# Patient Record
Sex: FEMALE | Race: WHITE | NOT HISPANIC OR LATINO | Employment: FULL TIME | URBAN - METROPOLITAN AREA
[De-identification: names, ages, dates, MRNs, and addresses within clinical notes are randomized per-mention and may not be internally consistent; named-entity substitution may affect disease eponyms.]

---

## 2023-11-22 ENCOUNTER — LAB (OUTPATIENT)
Dept: LAB | Facility: LAB | Age: 63
End: 2023-11-22
Payer: COMMERCIAL

## 2023-11-22 DIAGNOSIS — E78.5 HYPERLIPIDEMIA, UNSPECIFIED: ICD-10-CM

## 2023-11-22 DIAGNOSIS — Z00.00 ENCOUNTER FOR GENERAL ADULT MEDICAL EXAMINATION WITHOUT ABNORMAL FINDINGS: Primary | ICD-10-CM

## 2023-11-22 LAB
ALBUMIN SERPL BCP-MCNC: 4.2 G/DL (ref 3.4–5)
ALP SERPL-CCNC: 68 U/L (ref 33–136)
ALT SERPL W P-5'-P-CCNC: 13 U/L (ref 7–45)
AMPHETAMINES UR QL SCN: NORMAL
ANION GAP SERPL CALC-SCNC: 13 MMOL/L (ref 10–20)
APPEARANCE UR: ABNORMAL
AST SERPL W P-5'-P-CCNC: 16 U/L (ref 9–39)
BARBITURATES UR QL SCN: NORMAL
BASOPHILS # BLD AUTO: 0.06 X10*3/UL (ref 0–0.1)
BASOPHILS NFR BLD AUTO: 1.1 %
BENZODIAZ UR QL SCN: NORMAL
BILIRUB SERPL-MCNC: 0.7 MG/DL (ref 0–1.2)
BILIRUB UR STRIP.AUTO-MCNC: NEGATIVE MG/DL
BUN SERPL-MCNC: 11 MG/DL (ref 6–23)
BZE UR QL SCN: NORMAL
CALCIUM SERPL-MCNC: 10 MG/DL (ref 8.6–10.3)
CANNABINOIDS UR QL SCN: NORMAL
CHLORIDE SERPL-SCNC: 102 MMOL/L (ref 98–107)
CHOLEST SERPL-MCNC: 202 MG/DL (ref 0–199)
CHOLESTEROL/HDL RATIO: 2.3
CO2 SERPL-SCNC: 27 MMOL/L (ref 21–32)
COLOR UR: YELLOW
CREAT SERPL-MCNC: 0.76 MG/DL (ref 0.5–1.05)
EOSINOPHIL # BLD AUTO: 0.15 X10*3/UL (ref 0–0.7)
EOSINOPHIL NFR BLD AUTO: 2.7 %
ERYTHROCYTE [DISTWIDTH] IN BLOOD BY AUTOMATED COUNT: 12.3 % (ref 11.5–14.5)
FENTANYL+NORFENTANYL UR QL SCN: NORMAL
GFR SERPL CREATININE-BSD FRML MDRD: 88 ML/MIN/1.73M*2
GLUCOSE SERPL-MCNC: 85 MG/DL (ref 74–99)
GLUCOSE UR STRIP.AUTO-MCNC: NEGATIVE MG/DL
HCT VFR BLD AUTO: 40.9 % (ref 36–46)
HDLC SERPL-MCNC: 89.7 MG/DL
HGB BLD-MCNC: 13.6 G/DL (ref 12–16)
IMM GRANULOCYTES # BLD AUTO: 0.02 X10*3/UL (ref 0–0.7)
IMM GRANULOCYTES NFR BLD AUTO: 0.4 % (ref 0–0.9)
KETONES UR STRIP.AUTO-MCNC: NEGATIVE MG/DL
LDLC SERPL CALC-MCNC: 91 MG/DL
LEUKOCYTE ESTERASE UR QL STRIP.AUTO: ABNORMAL
LYMPHOCYTES # BLD AUTO: 1.74 X10*3/UL (ref 1.2–4.8)
LYMPHOCYTES NFR BLD AUTO: 31.5 %
MCH RBC QN AUTO: 30.8 PG (ref 26–34)
MCHC RBC AUTO-ENTMCNC: 33.3 G/DL (ref 32–36)
MCV RBC AUTO: 93 FL (ref 80–100)
MONOCYTES # BLD AUTO: 0.45 X10*3/UL (ref 0.1–1)
MONOCYTES NFR BLD AUTO: 8.2 %
NEUTROPHILS # BLD AUTO: 3.1 X10*3/UL (ref 1.2–7.7)
NEUTROPHILS NFR BLD AUTO: 56.1 %
NITRITE UR QL STRIP.AUTO: POSITIVE
NON HDL CHOLESTEROL: 112 MG/DL (ref 0–149)
NRBC BLD-RTO: 0 /100 WBCS (ref 0–0)
OPIATES UR QL SCN: NORMAL
OXYCODONE+OXYMORPHONE UR QL SCN: NORMAL
PCP UR QL SCN: NORMAL
PH UR STRIP.AUTO: 7 [PH]
PLATELET # BLD AUTO: 209 X10*3/UL (ref 150–450)
POTASSIUM SERPL-SCNC: 3.9 MMOL/L (ref 3.5–5.3)
PROT SERPL-MCNC: 6.6 G/DL (ref 6.4–8.2)
PROT UR STRIP.AUTO-MCNC: NEGATIVE MG/DL
RBC # BLD AUTO: 4.42 X10*6/UL (ref 4–5.2)
RBC # UR STRIP.AUTO: NEGATIVE /UL
RBC #/AREA URNS AUTO: ABNORMAL /HPF
SODIUM SERPL-SCNC: 138 MMOL/L (ref 136–145)
SP GR UR STRIP.AUTO: 1.01
TRIGL SERPL-MCNC: 106 MG/DL (ref 0–149)
TSH SERPL-ACNC: 1.72 MIU/L (ref 0.44–3.98)
UROBILINOGEN UR STRIP.AUTO-MCNC: <2 MG/DL
VLDL: 21 MG/DL (ref 0–40)
WBC # BLD AUTO: 5.5 X10*3/UL (ref 4.4–11.3)
WBC #/AREA URNS AUTO: ABNORMAL /HPF

## 2023-11-22 PROCEDURE — 80061 LIPID PANEL: CPT

## 2023-11-22 PROCEDURE — 85025 COMPLETE CBC W/AUTO DIFF WBC: CPT

## 2023-11-22 PROCEDURE — 81001 URINALYSIS AUTO W/SCOPE: CPT

## 2023-11-22 PROCEDURE — 84443 ASSAY THYROID STIM HORMONE: CPT

## 2023-11-22 PROCEDURE — 80053 COMPREHEN METABOLIC PANEL: CPT

## 2023-11-22 PROCEDURE — 80307 DRUG TEST PRSMV CHEM ANLYZR: CPT

## 2023-11-22 PROCEDURE — 36415 COLL VENOUS BLD VENIPUNCTURE: CPT

## 2024-06-26 ENCOUNTER — OFFICE VISIT (OUTPATIENT)
Dept: PAIN MEDICINE | Facility: CLINIC | Age: 64
End: 2024-06-26
Payer: COMMERCIAL

## 2024-06-26 ENCOUNTER — HOSPITAL ENCOUNTER (OUTPATIENT)
Dept: RADIOLOGY | Facility: HOSPITAL | Age: 64
Discharge: HOME | End: 2024-06-26
Payer: COMMERCIAL

## 2024-06-26 VITALS
WEIGHT: 148 LBS | BODY MASS INDEX: 26.22 KG/M2 | SYSTOLIC BLOOD PRESSURE: 131 MMHG | RESPIRATION RATE: 18 BRPM | DIASTOLIC BLOOD PRESSURE: 63 MMHG | OXYGEN SATURATION: 97 % | TEMPERATURE: 98.1 F | HEART RATE: 84 BPM | HEIGHT: 63 IN

## 2024-06-26 DIAGNOSIS — M79.18 RIGHT BUTTOCK PAIN: ICD-10-CM

## 2024-06-26 DIAGNOSIS — M79.18 RIGHT BUTTOCK PAIN: Primary | ICD-10-CM

## 2024-06-26 PROCEDURE — 1159F MED LIST DOCD IN RCRD: CPT | Performed by: ANESTHESIOLOGY

## 2024-06-26 PROCEDURE — 73502 X-RAY EXAM HIP UNI 2-3 VIEWS: CPT | Mod: RT

## 2024-06-26 PROCEDURE — 99214 OFFICE O/P EST MOD 30 MIN: CPT | Performed by: ANESTHESIOLOGY

## 2024-06-26 PROCEDURE — 1125F AMNT PAIN NOTED PAIN PRSNT: CPT | Performed by: ANESTHESIOLOGY

## 2024-06-26 PROCEDURE — 1160F RVW MEDS BY RX/DR IN RCRD: CPT | Performed by: ANESTHESIOLOGY

## 2024-06-26 PROCEDURE — 73502 X-RAY EXAM HIP UNI 2-3 VIEWS: CPT | Mod: RIGHT SIDE | Performed by: RADIOLOGY

## 2024-06-26 PROCEDURE — 1036F TOBACCO NON-USER: CPT | Performed by: ANESTHESIOLOGY

## 2024-06-26 RX ORDER — ATORVASTATIN CALCIUM 10 MG/1
10 TABLET, FILM COATED ORAL
COMMUNITY
Start: 2022-11-08

## 2024-06-26 RX ORDER — TRAMADOL HYDROCHLORIDE 50 MG/1
50 TABLET ORAL EVERY 12 HOURS PRN
COMMUNITY

## 2024-06-26 SDOH — ECONOMIC STABILITY: FOOD INSECURITY: WITHIN THE PAST 12 MONTHS, THE FOOD YOU BOUGHT JUST DIDN'T LAST AND YOU DIDN'T HAVE MONEY TO GET MORE.: NEVER TRUE

## 2024-06-26 SDOH — ECONOMIC STABILITY: FOOD INSECURITY: WITHIN THE PAST 12 MONTHS, YOU WORRIED THAT YOUR FOOD WOULD RUN OUT BEFORE YOU GOT MONEY TO BUY MORE.: NEVER TRUE

## 2024-06-26 ASSESSMENT — PATIENT HEALTH QUESTIONNAIRE - PHQ9
2. FEELING DOWN, DEPRESSED OR HOPELESS: NOT AT ALL
SUM OF ALL RESPONSES TO PHQ9 QUESTIONS 1 AND 2: 0
1. LITTLE INTEREST OR PLEASURE IN DOING THINGS: NOT AT ALL

## 2024-06-26 ASSESSMENT — ENCOUNTER SYMPTOMS
EYE REDNESS: 0
BACK PAIN: 1
LOSS OF SENSATION IN FEET: 0
OCCASIONAL FEELINGS OF UNSTEADINESS: 0
DEPRESSION: 0
SHORTNESS OF BREATH: 0

## 2024-06-26 ASSESSMENT — COLUMBIA-SUICIDE SEVERITY RATING SCALE - C-SSRS
1. IN THE PAST MONTH, HAVE YOU WISHED YOU WERE DEAD OR WISHED YOU COULD GO TO SLEEP AND NOT WAKE UP?: NO
2. HAVE YOU ACTUALLY HAD ANY THOUGHTS OF KILLING YOURSELF?: NO
6. HAVE YOU EVER DONE ANYTHING, STARTED TO DO ANYTHING, OR PREPARED TO DO ANYTHING TO END YOUR LIFE?: NO

## 2024-06-26 ASSESSMENT — PAIN DESCRIPTION - DESCRIPTORS: DESCRIPTORS: ACHING;SHARP;PRESSURE

## 2024-06-26 ASSESSMENT — PAIN SCALES - GENERAL
PAINLEVEL_OUTOF10: 9
PAINLEVEL: 9

## 2024-06-26 ASSESSMENT — LIFESTYLE VARIABLES
HOW OFTEN DO YOU HAVE A DRINK CONTAINING ALCOHOL: 2-3 TIMES A WEEK
HOW OFTEN DO YOU HAVE SIX OR MORE DRINKS ON ONE OCCASION: NEVER
HOW MANY STANDARD DRINKS CONTAINING ALCOHOL DO YOU HAVE ON A TYPICAL DAY: 3 OR 4
AUDIT-C TOTAL SCORE: 4
TOTAL SCORE: 0
SKIP TO QUESTIONS 9-10: 0

## 2024-06-26 ASSESSMENT — PAIN - FUNCTIONAL ASSESSMENT: PAIN_FUNCTIONAL_ASSESSMENT: 0-10

## 2024-06-26 NOTE — PROGRESS NOTES
Chief Complain  Follow-up (For pain in back and right hip. No new images./Had Lumbar facet 11/2022, with a great amount of relief and believe it started to wear off 2 weeks ago. Currently take tramdol from Dr. Salgado from [facility].)       History Of Present Illness  Lilli Guzman is a 64 y.o. female here for right buttock pain. She has been having this pain since last month. The patient rates the pain at 9  on a scale from 0-10.  The patient describes pain as aching.  The pain is worsened by prolonged sitting and going up stairs and is alleviated by lying down.  Since the last visit the pain has worsened.  The patient denies any fever, chills, weight loss, bladder/bowel incontinence.         Past Medical History  She has a past medical history of Atypical squamous cells of undetermined significance on cytologic smear of cervix (ASC-US), Encounter for screening for malignant neoplasm of vagina, Menopausal and female climacteric states, Other specified postprocedural states, Other specified postprocedural states, Personal history of other complications of pregnancy, childbirth and the puerperium, Personal history of other medical treatment, and Personal history of other medical treatment.    Surgical History  She has a past surgical history that includes Other surgical history (07/15/2015); Tubal ligation (07/15/2015); Tonsillectomy (07/24/2017); and Incisional breast biopsy (07/21/2015).     Social History  She reports that she has quit smoking. Her smoking use included cigarettes. She has never been exposed to tobacco smoke. She has never used smokeless tobacco. She reports current alcohol use. She reports that she does not use drugs.    Family History  No family history on file.     Allergies  Patient has no known allergies.    Review of Systems  Review of Systems   HENT:  Negative for ear pain.    Eyes:  Negative for redness.   Respiratory:  Negative for shortness of breath.    Cardiovascular:  Negative  "for chest pain.   Musculoskeletal:  Positive for back pain.   Psychiatric/Behavioral:  Negative for suicidal ideas.         Physical Exam  Physical Exam  Constitutional:       Appearance: Normal appearance.   HENT:      Head: Normocephalic and atraumatic.   Eyes:      Extraocular Movements: Extraocular movements intact.   Cardiovascular:      Rate and Rhythm: Normal rate and regular rhythm.   Pulmonary:      Effort: Pulmonary effort is normal.   Abdominal:      Palpations: Abdomen is soft.   Musculoskeletal:      Cervical back: Neck supple.        Legs:    Skin:     General: Skin is warm.   Neurological:      General: No focal deficit present.      Mental Status: She is alert and oriented to person, place, and time.   Psychiatric:         Mood and Affect: Mood normal.         Behavior: Behavior normal.           Last Recorded Vitals  Blood pressure 131/63, pulse 84, temperature 36.7 °C (98.1 °F), resp. rate 18, height 1.6 m (5' 3\"), weight 67.1 kg (148 lb), SpO2 97%.       Assessment/Plan     Lilli Guzman is a 64 y.o. female here for evaluation of right buttock pain.  She has been experiencing the symptoms for past 1 month or so.  Denies any significant trauma or inciting event.  The pain is worse with walking upstairs and sitting for prolonged period time.  On physical examination she does not have any significant focal neurological deficit.  She has some tenderness in her buttock however no tenderness over the ischial bursa area.  Does have increased pain with external rotation of her right hip.  I would recommend getting an x-ray of her hip to rule out any significant osteoarthritic changes.  May consider corticosteroid injection if there is some osteoarthritic changes.  If negative would recommend trial of physical therapy.          Keaton Medina MD  "

## 2024-06-27 ENCOUNTER — APPOINTMENT (OUTPATIENT)
Dept: PAIN MEDICINE | Facility: CLINIC | Age: 64
End: 2024-06-27
Payer: COMMERCIAL

## 2024-06-28 ENCOUNTER — TELEPHONE (OUTPATIENT)
Dept: PAIN MEDICINE | Facility: CLINIC | Age: 64
End: 2024-06-28
Payer: COMMERCIAL

## 2024-06-28 NOTE — TELEPHONE ENCOUNTER
Called to go over xray results  ----- Message from Keaton Medina MD sent at 6/27/2024  1:09 PM EDT -----  I reviewed the x-ray of her hip does not reveal any significant osteoarthritic changes.  I would recommend that she start the physical therapy, if she does not respond would get an MRI of her lumbar spine for further evaluation.  ----- Message -----  From: Interface, Radiology Results In  Sent: 6/27/2024  10:17 AM EDT  To: Keaton Medina MD

## 2024-07-03 ENCOUNTER — TELEPHONE (OUTPATIENT)
Dept: PAIN MEDICINE | Facility: CLINIC | Age: 64
End: 2024-07-03
Payer: COMMERCIAL

## 2024-07-03 DIAGNOSIS — M79.18 RIGHT BUTTOCK PAIN: Primary | ICD-10-CM

## 2024-07-15 NOTE — PROGRESS NOTES
Physical Therapy    Physical Therapy Evaluation and Treatment      Patient Name: Lilli Guzman  MRN: [id number]  Today's Date: 7/16/2024  Time Calculation  Start Time: 1134  Stop Time: 1215  Time Calculation (min): 41 min    Insurance - reviewed   Visit number: 1 (updated 07/16/24)   Payor: [employer] [facility] / Plan: [employer] MED / Product Type: *No Product type* /    60 PT/OT/ST V PCY 0 USED NO AUTH NEEDED PAYS % NO DED   Referring Provider: Keaton Medina MD       Assessment:  Lilli Guzman  is a 64 y.o. old patient who participated in a physical therapy evaluation today due to R buttock pain. Chronic hx of low back pain, able to effect pain with lumbar movements (suspect extension bias) and SLR test. Lilli presents with signs and symptoms consistent with R sided lumbar radiculopathy. Lilli's impairments include: pain and decreased strength.   Due to these impairments, she has the following functional limitations and participation restrictions: difficulty performing household activities, difficulty performing recreational activities, and difficulty with sleeping. Lilli's clinical presentation is stable and/or uncomplicated characteristics with the level of complexity being low. Lilli's potential for rehab is good.  Skilled physical therapy services are appropriate and beneficial in order to achieve measurable and meaningful change in the objective tests and measures. Utilization of skilled physical therapy services will aid in advancing her functional status and attaining her therapy-related goals. Lilli verbalized understanding and is in agreement with all goals and plan of care.  Lilli left session with all questions answered and no increase in symptoms.      Plan: assess response to HEP; manual interventions over R glute for pain control?; initiate core/hip strength  OP PT Plan  Treatment/Interventions: Aquatic therapy, Cryotherapy, Dry needling, Education/ Instruction,  Electrical stimulation, Hot pack, Gait training, Manual therapy, Neuromuscular re-education, Self care/ home management, Taping techniques, Therapeutic exercises, Therapeutic activities, Ultrasound  PT Plan: Skilled PT  PT Frequency: 1 time per week  Duration: 3-5 weeks  Rehab Potential: Good  Plan of Care Agreement: Patient    Current Problem:   Problem List Items Addressed This Visit             ICD-10-CM       Musculoskeletal and Injuries    Back pain - Primary M54.9    Gluteal pain M79.18     Other Visit Diagnoses         Codes    Right buttock pain     M79.18    Relevant Orders    Follow Up In Physical Therapy            Subjective    Lilli Guzman  is a 64 y.o. female  presenting to the clinic with chief concern of R buttock pain. Notes chronic hx of back pain previously relieved with injection. Describes pain as a constant ache, intermittently sharp with quick movements.     Lilli Guzman  reports symptoms worsened 2 months ago.     Reports symptoms are better with Tramadol, sleeping with pillow between knees    Reports symptoms are worse with walking > 1/4 mile, sleeping, going up stairs, sitting for > 30 mins    Lilli Guzman  denies:  numbness, tingling, diplopia, drop attacks, dysarthria, dysphagia, dizziness, saddle anesthesia, bowel changes, bladder changes, unexpected weight loss within the past 4 weeks, and unexpected weight gain within the past 4 weeks    Prior Treatment/diagnostic images:  XRAY R HIP WITH PELVIS IMPRESSION 6/26/24: Minimal degenerative spurring right hip. Degenerative changes lumbosacral spine. Rounded density overlying the right gluteal region.    LUMBAR MRI IMPRESSION 6/20/22: Redemonstration of variant anatomy with lumbarization of the S1  vertebral body. Multilevel degenerative changes of the lumbar spine including disc protrusions/osteophytes at few levels which cause variable degree of thecal sac effacement and neural foraminal narrowing, most pronounced at the level of  "L5-S1.    Medical History Form: Reviewed (scanned into chart)    Living Environment: Lives in single level house. Stairs to basement with rail.     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     Prior Level of Function: IND with all mobility, ADLs, IADLs, driving    Exercise: regularly as directed 7x times/week- walking    Functional Limitations: household chores, recreational activities, caregiver duties    Pt goals for therapy:  \"relieve pain or refer back to pain management\"    Precautions:  Fall Risk: None   Denies: Cancer, Pacemaker, Diabetes, Hypertension, latex allergy, epilepsy/seizures, other known cardiac problems, other known neurologic problem, other known pulmonary problems, unexpected weight gain or loss, saddle anesthesia, night sweats, diplopia, and drop attacks  Past Surgical history: carpal tunnel release March 2024  Past Medical history: none        Pain:  8-9/10  pain location: R glute        Objective   Functional Assessments  Posture: WNL  Transfers: IND  Bed Mobility: IND  Gait: IND  Stairs: NP      Myotomes/Strength:  MMT in sitting unless otherwise documented, * indicates pain  Hip Flex (L2) R/L: 4/4  Hip Add R/L: 4+/4+  Hip Abd R/L: 4/4  Hip Ext R/L: NP  Knee Ext (L3) R/L: 4+*/5  Knee Flex R/L: 5/5  Ankle DF (L4) R/L: 5/5  Ankle PF (S1) R/L: 5/5   Great Toe Ext (L5) R/L: NP  Abdominals (sitting unsupported): fair  Back Extensors (sitting unsupported): fair  core: fair demonstrated by lack of core engagement during bed mobility and transfers    Lumbar RANGE OF MOTION:  Standing Loaded Flexion: WNL to distal 1/3 shin  Repeated Loaded Flexion: no change in sx  Standing Loaded Ext: WNL  Repeated Loaded Ext: no change in sx  Side Bend R/L: WNL to knee height/WNL to knee height  Rotation R/L: WNL/WNL  LTR R/L: NP  Supine Flex (SKTC): no change in sx  Prone Lie: abolished R glute pain  KAREN: abolished R glute  PPU: NP    Special Tests:  Traction (manual unilateral R leg pull): no change in " sx  SLR: positive R and negative left       Outcome Measures:  Other Measures  Oswestry Disablity Index (KALIA): 15     Treatments:    Therapeutic Exercise:  11 minutes    Access Code: [email or web address]  - Lying Prone  - 1-3 x daily - 7 x weekly - 1 sets - 1 reps - 2 min hold  - Supine Piriformis Stretch with Foot on Ground  - 1-3 x daily - 7 x weekly - 1 sets - 3 reps - 30 sec hold    Attempted but discontinued due to pain and/or no effect on sx  Seated anterior pelvic tilts x20 reps  Standing lumbar ext x10 reps      EDUCATION:  Outpatient Education  Individual(s) Educated: Patient  Education Provided: POC, Home Exercise Program  Risk and Benefits Discussed with Patient/Caregiver/Other: yes  Patient/Caregiver Demonstrated Understanding: yes  Plan of Care Discussed and Agreed Upon: yes  Patient Response to Education: Patient/Caregiver Verbalized Understanding of Information, Patient/Caregiver Performed Return Demonstration of Exercises/Activities    -Educated Lilli  on the role of PT and PT POC  -Educated Lilli regarding benefit and purpose of skilled PT services along with results of examination findings and how this correlates to their chief concern  -Answered Lilli's questions in full.  -Educated Lilli on concept of pain centralization  -Lilli Guzman advised to hold any ex if it increases pain, Lilli Guzman verbalized understanding    Goals:  STG's (within 2 weeks)  Lilli Guzman will decrease pain by >/= 2/10 points from baseline to improve ability to perform household/recreational activities.  Lilli Guzman will be able to sleep through the night with less than 2 interruptions due to pain in order to improve mental and physical well-being in order to safely participate in ADLs.   Lilli Guzman will demonstrate independence,  excellent understanding of and report compliance with at least 3 exercises in her HEP to facilitate the learning process to maximize PT benefits and to facilitate independent  rehab program upon discharge.    LTG's (by discharge)  Lilli Guzman will decrease pain to 0-2/10 to improve ability to perform household/recreational activities.  Lilli Guzman will be able to sleep through the night without waking due to pain in order to improve mental and physical well-being in order to safely participate in ADLs.   Lilli Guzman will demonstrate independence,  excellent understanding of and report compliance with HEP to facilitate the learning process to maximize PT benefits and to facilitate independent rehab program upon discharge.  Lilli Guzman will decrease modified KALIA score by at least 9.5 points (MCID) to demonstrate reduced back pain during everyday tasks.   Lilli Guzman will improve standing and gait endurance to at least 15 mins without increase in symptoms beyond baseline to assist with shopping within the community.     Time Calculation  Start Time: 1134  Stop Time: 1215  Time Calculation (min): 41 min  PT Evaluation Time Entry  PT Evaluation (Low) Time Entry: 30  PT Therapeutic Procedures Time Entry  Therapeutic Exercise Time Entry: 11                   patient to return to ED should symptoms worsen

## 2024-07-16 ENCOUNTER — EVALUATION (OUTPATIENT)
Dept: PHYSICAL THERAPY | Facility: CLINIC | Age: 64
End: 2024-07-16
Payer: COMMERCIAL

## 2024-07-16 DIAGNOSIS — M54.9 BACK PAIN, UNSPECIFIED BACK LOCATION, UNSPECIFIED BACK PAIN LATERALITY, UNSPECIFIED CHRONICITY: Primary | ICD-10-CM

## 2024-07-16 DIAGNOSIS — M79.18 GLUTEAL PAIN: ICD-10-CM

## 2024-07-16 DIAGNOSIS — M79.18 RIGHT BUTTOCK PAIN: ICD-10-CM

## 2024-07-16 PROCEDURE — 97161 PT EVAL LOW COMPLEX 20 MIN: CPT | Mod: GP

## 2024-07-16 PROCEDURE — 97110 THERAPEUTIC EXERCISES: CPT | Mod: GP

## 2024-07-16 SDOH — ECONOMIC STABILITY: FOOD INSECURITY: WITHIN THE PAST 12 MONTHS, YOU WORRIED THAT YOUR FOOD WOULD RUN OUT BEFORE YOU GOT MONEY TO BUY MORE.: NEVER TRUE

## 2024-07-16 SDOH — ECONOMIC STABILITY: FOOD INSECURITY: WITHIN THE PAST 12 MONTHS, THE FOOD YOU BOUGHT JUST DIDN'T LAST AND YOU DIDN'T HAVE MONEY TO GET MORE.: NEVER TRUE

## 2024-07-16 ASSESSMENT — ENCOUNTER SYMPTOMS
LOSS OF SENSATION IN FEET: 0
DEPRESSION: 0
OCCASIONAL FEELINGS OF UNSTEADINESS: 0

## 2024-07-16 ASSESSMENT — COLUMBIA-SUICIDE SEVERITY RATING SCALE - C-SSRS
6. HAVE YOU EVER DONE ANYTHING, STARTED TO DO ANYTHING, OR PREPARED TO DO ANYTHING TO END YOUR LIFE?: NO
1. IN THE PAST MONTH, HAVE YOU WISHED YOU WERE DEAD OR WISHED YOU COULD GO TO SLEEP AND NOT WAKE UP?: NO
2. HAVE YOU ACTUALLY HAD ANY THOUGHTS OF KILLING YOURSELF?: NO

## 2024-07-16 ASSESSMENT — PATIENT HEALTH QUESTIONNAIRE - PHQ9
SUM OF ALL RESPONSES TO PHQ9 QUESTIONS 1 AND 2: 0
2. FEELING DOWN, DEPRESSED OR HOPELESS: NOT AT ALL
1. LITTLE INTEREST OR PLEASURE IN DOING THINGS: NOT AT ALL

## 2024-07-19 NOTE — PROGRESS NOTES
Physical Therapy    Physical Therapy  Treatment      Patient Name: Lilli Guzman  MRN: [id number]  Today's Date: 7/22/2024  Time Calculation  Start Time: 1100  Stop Time: 1138  Time Calculation (min): 38 min    Insurance - reviewed   Visit number: 2 (updated 07/22/24)   Payor: [employer] [facility] / Plan: [employer] / Product Type: *No Product type* /    60 PT/OT/ST V PCY 0 USED NO AUTH NEEDED PAYS % NO DED   Referring Provider: Keaton Medina MD     Assessment:  No improvement with prior HEP therefore discontinued. Negative response to Nustep today: no pain during but increased pain with walking following. Relief with supine position. Able to tolerate gentle hip/glute isometrics today. No change in pain at end of session. Pt will continue to benefit from skilled PT services in order to decrease pain and improve function, walking tolerance, and ability to participate with granddaughter.     Plan: assess response to HEP; manual interventions over R glute for pain control?; initiate core/hip strength       Current Problem:   Problem List Items Addressed This Visit             ICD-10-CM    Back pain - Primary M54.9    Gluteal pain M79.18     Other Visit Diagnoses         Codes    Right buttock pain     M79.18            Subjective    Its hard to walk and go upstairs today: 8/10 R glute pain with walking. Pain with weight bearing on R when trying to go upstairs.     Piriformis stretching severely increased her symptoms, unable to tolerate. Prone position increased her low back pain. She is unsure what affect it has on her buttock pain because she was concerned about the back pain.     Pain wakes her up at night. Pain limits her walking distance. She would like to be able to walk with her 4 year old granddaughter.     HEP compliance: yes, but without relief    Precautions:  Fall Risk: None   Denies: Cancer, Pacemaker, Diabetes, Hypertension, latex allergy, epilepsy/seizures, other known cardiac  "problems, other known neurologic problem, other known pulmonary problems, unexpected weight gain or loss, saddle anesthesia, night sweats, diplopia, and drop attacks  Past Surgical history: carpal tunnel release March 2024  Past Medical history: none      Pain:  8/10  pain location: R glute      Objective     Treatments:    Therapeutic Exercise:  38 minutes    Access Code: [email or web address]  Reviewed concept of centralization vs peripheralization  NuStep L4 x 8 min, warm up, subjective, no pain during but INC pain upon standing/walking  Supine glute sets 5\" holds 2 x 10, cues for gentle contractions - HEP  HL hip abd ball squeeze 5\" holds 2 x 10 - HEP  HL hip abd isometric 5\" holds 2 x 10 - HEP     Verbal review: discontinued due to pain and/or no effect on sx  - Lying Prone  - 1-3 x daily - 7 x weekly - 1 sets - 1 reps - 2 min hold  - Supine Piriformis Stretch with Foot on Ground  - 1-3 x daily - 7 x weekly - 1 sets - 3 reps - 30 sec hold  Seated anterior pelvic tilts x20 reps  Standing lumbar ext x10 reps      EDUCATION: HEP updated. Ther ex cues provided along with rationale for interventions this date.          Time Calculation  Start Time: 1100  Stop Time: 1138  Time Calculation (min): 38 min     PT Therapeutic Procedures Time Entry  Therapeutic Exercise Time Entry: 38                  "

## 2024-07-22 ENCOUNTER — TREATMENT (OUTPATIENT)
Dept: PHYSICAL THERAPY | Facility: CLINIC | Age: 64
End: 2024-07-22
Payer: COMMERCIAL

## 2024-07-22 DIAGNOSIS — M79.18 GLUTEAL PAIN: ICD-10-CM

## 2024-07-22 DIAGNOSIS — M79.18 RIGHT BUTTOCK PAIN: ICD-10-CM

## 2024-07-22 DIAGNOSIS — M54.9 BACK PAIN, UNSPECIFIED BACK LOCATION, UNSPECIFIED BACK PAIN LATERALITY, UNSPECIFIED CHRONICITY: Primary | ICD-10-CM

## 2024-07-22 PROCEDURE — 97110 THERAPEUTIC EXERCISES: CPT | Mod: GP

## 2024-07-30 ENCOUNTER — TREATMENT (OUTPATIENT)
Dept: PHYSICAL THERAPY | Facility: CLINIC | Age: 64
End: 2024-07-30
Payer: COMMERCIAL

## 2024-07-30 DIAGNOSIS — M54.9 BACK PAIN, UNSPECIFIED BACK LOCATION, UNSPECIFIED BACK PAIN LATERALITY, UNSPECIFIED CHRONICITY: Primary | ICD-10-CM

## 2024-07-30 DIAGNOSIS — M79.18 RIGHT BUTTOCK PAIN: ICD-10-CM

## 2024-07-30 DIAGNOSIS — M79.18 GLUTEAL PAIN: ICD-10-CM

## 2024-07-30 PROCEDURE — 97530 THERAPEUTIC ACTIVITIES: CPT | Mod: GP

## 2024-07-30 NOTE — PROGRESS NOTES
Physical Therapy    Physical Therapy  Treatment      Patient Name: Lilli Guzman  MRN: [id number]  Today's Date: 7/30/2024  Time Calculation  Start Time: 1011  Stop Time: 1037  Time Calculation (min): 26 min    Insurance - reviewed   Visit number: 3 (updated 07/30/24)   Payor: [employer] [facility] / Plan: [employer] / Product Type: *No Product type* /    60 PT/OT/ST V PCY 0 USED NO AUTH NEEDED PAYS % NO DED   Referring Provider: Keaton Medina MD     Assessment:  Lilli Guzman has completed 3 sessions of physical therapy treatment with emphasis upon strength, education, ROM. She reports no improvement and reports pain is starting to peripheralize down right leg. Her Paco has regressed from 15/50 to 27/50. Her pain is severely limiting her walking/standing tolerance, her sleep, and ability to participate with her granddaughter. She declines further treatment following re-assessment today: offered therapeutic exercise and manual. She has not made any progress toward short or long term goals. She plans to follow up with Dr. Medina as she has had significant benefit from injections before. Therefore, skilled PT services are no longer warranted/necessary. Lilli Guzman to be discharged from PT services and back to care under referring physician. Lilli Guzman voiced agreement and satisfaction with this plan.     Plan: discharge patient    Current Problem:   Problem List Items Addressed This Visit             ICD-10-CM    Back pain - Primary M54.9    Gluteal pain M79.18     Other Visit Diagnoses         Codes    Right buttock pain     M79.18            Subjective    States she is doing HEP and feels PT is not helping so far. She does not have increased pain with current HEP, but no relief.  She feels the pain is occasionally going down posterior thigh now.   R glute pain continues to be worse with stairs and is severely limiting her sleep. 8/10 at start of session with walking.   Has also  tried her TENS unit at home with no relief.   She would like to be discharged from physical therapy today. She is going to call and make a follow up appointment with Dr. Medina as she had good relief when she had same issue on left side.     HEP compliance: yes, but without relief    Precautions:  Fall Risk: None   Denies: Cancer, Pacemaker, Diabetes, Hypertension, latex allergy, epilepsy/seizures, other known cardiac problems, other known neurologic problem, other known pulmonary problems, unexpected weight gain or loss, saddle anesthesia, night sweats, diplopia, and drop attacks  Past Surgical history: carpal tunnel release March 2024  Past Medical history: none      Pain:  8/10  pain location: R glute      Objective   Functional Assessments  Posture: WNL >> has to sit with trunk lean to left to off weight R buttocks   Transfers: IND >> IND  Bed Mobility: IND >> INC   Gait: IND >> IND   Stairs: NP >> NP      Myotomes/Strength:  MMT in sitting unless otherwise documented, * indicates pain  Hip Flex (L2) R/L: 4/4 >>  4 * posterior LLE pain / 4   Hip Add R/L: 4+/4+ >> 4+ / 4+   Hip Abd R/L: 4/4 >>  4 / 4  Hip Ext R/L: NP >> not tested  Knee Ext (L3) R/L: 4+*/5 >> 4+ / 5   Knee Flex R/L: 5/5 >> 5 / 5  Ankle DF (L4) R/L: 5/5 >> 5 / 5  Ankle PF (S1) R/L: 5/5   >> 5 / 5  Great Toe Ext (L5) R/L: NP   Abdominals (sitting unsupported): fair >> fair  Back Extensors (sitting unsupported): fair >> fair   core: fair demonstrated by lack of core engagement during bed mobility and transfers >> no change      Lumbar RANGE OF MOTION: 07/30/24 no pain in stand at baseline   Standing Loaded Flexion: WNL to distal 1/3 shin >> no change  Repeated Loaded Flexion: no change in sx >> INC low back pain   Standing Loaded Ext: WNL >> WNL   Repeated Loaded Ext: no change in sx >> INC LBP   Side Bend R/L: WNL to knee height/WNL to knee height >> WNL no change / WNL INC LBP   Rotation R/L: WNL/WNL >> WNL / WNL   LTR R/L: NP >> NP   Supine Flex  "(SKTC): no change in sx >> NP  Prone Lie: abolished R glute pain >> NP  KAREN: abolished R glute >> NP   PPU: NP >> NP     Outcome Measures:  Other Measures  Oswestry Disablity Index (KALIA): 15 >> Other Measures  Oswestry Disablity Index (KALIA): 27     Treatments:    Therapeutic Exercise:     Access Code: [email or web address]  Not performed today:   Supine glute sets 5\" holds 2 x 10, cues for gentle contractions - HEP  HL hip abd ball squeeze 5\" holds 2 x 10 - HEP  HL hip abd isometric 5\" holds 2 x 10 - HEP     Therapeutic Activity:   Assessment of pt's POC goals completed today- see objective, goals, and assessment section. Educated pt regarding results of examination findings and discussed next steps in POC progression.     Goals:  STG's (within 2 weeks)  Lilli Guzman will decrease pain by >/= 2/10 points from baseline to improve ability to perform household/recreational activities. 07/30/24 GOAL NOT MET, no improvement reported   Lilli Guzman will be able to sleep through the night with less than 2 interruptions due to pain in order to improve mental and physical well-being in order to safely participate in ADLs. 07/30/24 GOAL NOT MET no improvement reported  Lilli Guzman will demonstrate independence,  excellent understanding of and report compliance with at least 3 exercises in her HEP to facilitate the learning process to maximize PT benefits and to facilitate independent rehab program upon discharge. 07/30/24 GOAL MET, pt reports good HEP compliance/understanding     LTG's (by discharge)  Lilli Guzman will decrease pain to 0-2/10 to improve ability to perform household/recreational activities. 07/30/24 GOAL NOT MET   Lilli Guzman will be able to sleep through the night without waking due to pain in order to improve mental and physical well-being in order to safely participate in ADLs. 07/30/24 GOAL NOT MET   Lilli Guzman will demonstrate independence,  excellent understanding of and report compliance with " HEP to facilitate the learning process to maximize PT benefits and to facilitate independent rehab program upon discharge. 07/30/24 GOAL MET   Lilli Guzman will decrease modified KALIA score by at least 9.5 points (MCID) to demonstrate reduced back pain during everyday tasks. 07/30/24 GOAL NOT MET, score regressed 27/50   Lilli Guzman will improve standing and gait endurance to at least 15 mins without increase in symptoms beyond baseline to assist with shopping within the community. 07/30/24 GOAL NOT MET     Time Calculation  Start Time: 1011  Stop Time: 1037  Time Calculation (min): 26 min     PT Therapeutic Procedures Time Entry  Therapeutic Activity Time Entry: 26

## 2024-08-08 ENCOUNTER — APPOINTMENT (OUTPATIENT)
Dept: PHYSICAL THERAPY | Facility: CLINIC | Age: 64
End: 2024-08-08
Payer: COMMERCIAL

## 2024-08-12 ENCOUNTER — TELEPHONE (OUTPATIENT)
Dept: PAIN MEDICINE | Facility: CLINIC | Age: 64
End: 2024-08-12
Payer: COMMERCIAL

## 2024-08-26 ENCOUNTER — OFFICE VISIT (OUTPATIENT)
Dept: PAIN MEDICINE | Facility: CLINIC | Age: 64
End: 2024-08-26
Payer: COMMERCIAL

## 2024-08-26 VITALS
DIASTOLIC BLOOD PRESSURE: 65 MMHG | TEMPERATURE: 97.9 F | SYSTOLIC BLOOD PRESSURE: 141 MMHG | WEIGHT: 143 LBS | BODY MASS INDEX: 25.34 KG/M2 | OXYGEN SATURATION: 98 % | HEIGHT: 63 IN | HEART RATE: 79 BPM | RESPIRATION RATE: 18 BRPM

## 2024-08-26 DIAGNOSIS — M46.1 BILATERAL SACROILIITIS (CMS-HCC): ICD-10-CM

## 2024-08-26 DIAGNOSIS — M46.1 SACROILIITIS (CMS-HCC): Primary | ICD-10-CM

## 2024-08-26 DIAGNOSIS — M54.9 BACK PAIN, UNSPECIFIED BACK LOCATION, UNSPECIFIED BACK PAIN LATERALITY, UNSPECIFIED CHRONICITY: ICD-10-CM

## 2024-08-26 PROCEDURE — 99215 OFFICE O/P EST HI 40 MIN: CPT | Performed by: NURSE PRACTITIONER

## 2024-08-26 RX ORDER — SODIUM CHLORIDE, SODIUM LACTATE, POTASSIUM CHLORIDE, CALCIUM CHLORIDE 600; 310; 30; 20 MG/100ML; MG/100ML; MG/100ML; MG/100ML
20 INJECTION, SOLUTION INTRAVENOUS CONTINUOUS
OUTPATIENT
Start: 2024-08-26

## 2024-08-26 ASSESSMENT — ENCOUNTER SYMPTOMS
DIARRHEA: 0
AGITATION: 0
NAUSEA: 0
CHILLS: 0
SHORTNESS OF BREATH: 0
CHEST TIGHTNESS: 0
WEAKNESS: 0
FEVER: 0
WHEEZING: 0
BACK PAIN: 1
DEPRESSION: 0
PALPITATIONS: 0
LOSS OF SENSATION IN FEET: 0
CONFUSION: 0
FATIGUE: 0
DIFFICULTY URINATING: 0
DIZZINESS: 0
CONSTIPATION: 0
NUMBNESS: 0
MYALGIAS: 1
FREQUENCY: 0
OCCASIONAL FEELINGS OF UNSTEADINESS: 0

## 2024-08-26 ASSESSMENT — PAIN DESCRIPTION - DESCRIPTORS: DESCRIPTORS: ACHING;STABBING

## 2024-08-26 ASSESSMENT — PAIN SCALES - GENERAL
PAINLEVEL_OUTOF10: 8
PAINLEVEL: 8

## 2024-08-26 ASSESSMENT — PAIN - FUNCTIONAL ASSESSMENT: PAIN_FUNCTIONAL_ASSESSMENT: 0-10

## 2024-08-26 NOTE — PROGRESS NOTES
Patient here for a follow up .  She continues to have back pain , radiates down the right buttocks .  Patient describes her pain as aching stabbing at times specially when walking upstairs walking in general pain is the worst when when trying to rest.  Patient is here to discuss further treatment options .  As she had her xray done , was only able to do three physical therapy visits, and had to stop them because if her back pain .    Patient has been taking tramadol , she gets 40 % pain relief, we don't prescribe the tramadol .

## 2024-08-26 NOTE — H&P (VIEW-ONLY)
Chief Complain  Follow-up visit for lower back pain and right buttocks pain.    History Of Present Illness  Lilli Guzman is a 64 y.o. female here for lower back pain radiating to right buttocks. The patient rates the pain at 8 on a scale from 0-10.  The patient describes pain as aching, throbbing, stabbing.  The pain is worsened by standing, prolonged sitting, walking, going up stairs, going down stairs, and squatting and is alleviated by medications opioid analgesics, position change, cortisone injections, sitting, and lying down.  Since the last visit the pain has worsened.  She is very active caring for her 4-year-old niece.  The patient denies any fever, chills, weight loss, weakness, bladder/ bowel incontinence, history of cancer, history of IV drug abuse, recent trauma.     Prior office visit:  6/26/2024 Dr. Medina  Lilli Guzman is a 64 y.o. female here for evaluation of right buttock pain.  She has been experiencing the symptoms for past 1 month or so.  Denies any significant trauma or inciting event.  The pain is worse with walking upstairs and sitting for prolonged period time.  On physical examination she does not have any significant focal neurological deficit.  She has some tenderness in her buttock however no tenderness over the ischial bursa area.  Does have increased pain with external rotation of her right hip.  I would recommend getting an x-ray of her hip to rule out any significant osteoarthritic changes.  May consider corticosteroid injection if there is some osteoarthritic changes.  If negative would recommend trial of physical therapy.     Procedures:    11/2/2022 right facet nerve block significant relief of lower back pain and symptoms.   9/28/2022 lumbar medial branch block the patient has had a 90% improvement in pain and function  7/13/2022 left lumbar transforaminal CHRISTEN significant improvement in symptom symptoms and pain relief.    Portions of record reviewed for pertinent issues:  active problem list, medication list, allergies, family history, social history, notes from last encounter, encounters, lab results, imaging and other available records.      I have personally reviewed the [facility] report for this patient. This report is scanned into the electronic medical record. I have considered the risks of abuse, dependence, addiction and diversion. It showed: Tramadol from Dr. Vazuqez his gabapentin from Dr. Medina.    Past Medical History  She has a past medical history of Atypical squamous cells of undetermined significance on cytologic smear of cervix (ASC-US), Encounter for screening for malignant neoplasm of vagina, Menopausal and female climacteric states, Other specified postprocedural states, Other specified postprocedural states, Personal history of other complications of pregnancy, childbirth and the puerperium, Personal history of other medical treatment, and Personal history of other medical treatment.    Surgical History  She has a past surgical history that includes Other surgical history (07/15/2015); Tubal ligation (07/15/2015); Tonsillectomy (07/24/2017); and Incisional breast biopsy (07/21/2015).     Social History  She reports that she has quit smoking. Her smoking use included cigarettes. She has never been exposed to tobacco smoke. She has never used smokeless tobacco. She reports current alcohol use. She reports that she does not use drugs.    Family History  No family history on file.     Allergies  Patient has no known allergies.    Review of Systems  Review of Systems   Constitutional:  Negative for chills, fatigue and fever.   Respiratory:  Negative for chest tightness, shortness of breath and wheezing.    Cardiovascular:  Negative for chest pain and palpitations.   Gastrointestinal:  Negative for constipation, diarrhea and nausea.   Genitourinary:  Negative for difficulty urinating and frequency.   Musculoskeletal:  Positive for back pain and myalgias.        Lower  "back pain and right buttock pain    Neurological:  Negative for dizziness, weakness and numbness.   Psychiatric/Behavioral:  Negative for agitation, confusion and suicidal ideas.         Physical Exam  Physical Exam  Constitutional:       General: She is not in acute distress.     Appearance: Normal appearance.          Comments: Decreased range of motion with lumbar flexion extension, pain with palpation of bilateral lumbar paraspinals, right sacroiliac notch tender to palpation, right gluteal region tenderness.  Bilateral SLR negative, right Ricardo Renetta positive right sacroiliac thigh thrust positive   HENT:      Head: Normocephalic.   Eyes:      Extraocular Movements: Extraocular movements intact.   Cardiovascular:      Rate and Rhythm: Normal rate.   Musculoskeletal:      Lumbar back: Tenderness present. Decreased range of motion. Negative right straight leg raise test and negative left straight leg raise test.   Neurological:      General: No focal deficit present.      Mental Status: She is alert and oriented to person, place, and time. Mental status is at baseline.      GCS: GCS eye subscore is 4. GCS verbal subscore is 5. GCS motor subscore is 6.      Cranial Nerves: Cranial nerves 2-12 are intact. No cranial nerve deficit.      Sensory: No sensory deficit.      Motor: Motor function is intact.      Coordination: Coordination is intact.      Gait: Gait is intact.      Deep Tendon Reflexes:      Reflex Scores:       Patellar reflexes are 3+ on the right side and 3+ on the left side.       Achilles reflexes are 3+ on the right side and 3+ on the left side.  Psychiatric:         Mood and Affect: Mood normal.         Behavior: Behavior normal.         Thought Content: Thought content normal.         Judgment: Judgment normal.           Last Recorded Vitals  Blood pressure 141/65, pulse 79, temperature 36.6 °C (97.9 °F), temperature source Temporal, resp. rate 18, height 1.6 m (5' 3\"), weight 64.9 kg (143 lb), " SpO2 98%.    Reviewed Images  6/27/2024 x-ray right hip with pelvis  FINDINGS:  Two views. No fracture or suspicious osseous lesions. Minimal  degenerative spurring inferior femoral head neck junction without  significant joint space narrowing. There are degenerative changes  lumbosacral junction. Brown metallic density structure projects  lateral gluteal region likely overlapping the patient although  attention recommended on clinical assessment.      IMPRESSION:  Minimal degenerative spurring right hip.      Degenerative changes lumbosacral spine.      Rounded density overlying the right gluteal region.        Reviewed Labs  Lab Results   Component Value Date    GLUCOSE 85 11/22/2023    CALCIUM 10.0 11/22/2023     11/22/2023    K 3.9 11/22/2023    CO2 27 11/22/2023     11/22/2023    BUN 11 11/22/2023    CREATININE 0.76 11/22/2023        Assessment/Plan     Lilli Guzman is a very pleasant 64 y.o. female here for follow-up of lower back pain and right gluteal region pain.  She had previously received multiple left and right lumbar epidural steroid injections, medial branch blocks with significant improvement in symptoms which has lasted almost 2 years, her pain is now returned after increasing her activity levels and caring for her 4-year-old niece.  She had right hip and pelvis x-ray which showed minimal degenerative changes.  She has completed 3 sessions of physical therapy which increased her pain levels.  She does report continuing home stretching regimen as tolerated.  She manages her pain with tramadol 50 mg from Dr. Lam which brings her pain from 8/10 to 6/10.  On physical examination she has decreased lumbar range of motion flexion extension, pain on palpation to right sacroiliac notch and bilateral lumbar paraspinals right worse than left.  Right gluteal region tender to palpation, no tenderness with deep palpation of piriformis region.  Right Ricardo Renetta and sacroiliac thigh thrust  positive.  Bilateral SLR negative.  Patient denies bowel or bladder incontinence or saddle paresthesia.  Symptoms consistent with sacroiliitis.  Will plan for right sacral iliac joint steroid injection under fluoroscopy.  Encouraged to continue with home stretching as tolerated.  Continue with current pain management medication as prescribed.  Encouraged to take at 1000 mg Tylenol 3 times a day.  All questions and concerns answered.  Plan to follow-up 6 to 8 weeks after right sacroiliac joint steroid injection.    Plan  At least 50% of the visit was involved in the discussion of the options for treatment. We discussed exercises, medication, interventional therapies and surgery. Healthy life style is essential with patient hard work to achieve the wellness. In addition; discussion with the patient and/or family about any of the diagnostic results, impressions and/or recommended diagnostic studies, prognosis, risks and benefits of treatment options, instructions for treatment and/or follow-up, importance of compliance with chosen treatment options, risk-factor reduction, and patient/family education.        *Please note this report has been produced using speech recognition software and may contain errors related to that system including grammar, punctuation and spelling as well as words and phrases that may be inappropriate. If there are questions or concerns, please feel free to contact me to clarify.    I spent 40 minutes in the professional and overall care of this patient.       Khanh Hay, MYRNA-CNP

## 2024-08-26 NOTE — PROGRESS NOTES
Chief Complain  Follow-up visit for lower back pain and right buttocks pain.    History Of Present Illness  Lilli Guzman is a 64 y.o. female here for lower back pain radiating to right buttocks. The patient rates the pain at 8 on a scale from 0-10.  The patient describes pain as aching, throbbing, stabbing.  The pain is worsened by standing, prolonged sitting, walking, going up stairs, going down stairs, and squatting and is alleviated by medications opioid analgesics, position change, cortisone injections, sitting, and lying down.  Since the last visit the pain has worsened.  She is very active caring for her 4-year-old niece.  The patient denies any fever, chills, weight loss, weakness, bladder/ bowel incontinence, history of cancer, history of IV drug abuse, recent trauma.     Prior office visit:  6/26/2024 Dr. Medina  Lilli Guzman is a 64 y.o. female here for evaluation of right buttock pain.  She has been experiencing the symptoms for past 1 month or so.  Denies any significant trauma or inciting event.  The pain is worse with walking upstairs and sitting for prolonged period time.  On physical examination she does not have any significant focal neurological deficit.  She has some tenderness in her buttock however no tenderness over the ischial bursa area.  Does have increased pain with external rotation of her right hip.  I would recommend getting an x-ray of her hip to rule out any significant osteoarthritic changes.  May consider corticosteroid injection if there is some osteoarthritic changes.  If negative would recommend trial of physical therapy.     Procedures:    11/2/2022 right facet nerve block significant relief of lower back pain and symptoms.   9/28/2022 lumbar medial branch block the patient has had a 90% improvement in pain and function  7/13/2022 left lumbar transforaminal CHRISTEN significant improvement in symptom symptoms and pain relief.    Portions of record reviewed for pertinent issues:  active problem list, medication list, allergies, family history, social history, notes from last encounter, encounters, lab results, imaging and other available records.      I have personally reviewed the [facility] report for this patient. This report is scanned into the electronic medical record. I have considered the risks of abuse, dependence, addiction and diversion. It showed: Tramadol from Dr. Vazquez his gabapentin from Dr. Medina.    Past Medical History  She has a past medical history of Atypical squamous cells of undetermined significance on cytologic smear of cervix (ASC-US), Encounter for screening for malignant neoplasm of vagina, Menopausal and female climacteric states, Other specified postprocedural states, Other specified postprocedural states, Personal history of other complications of pregnancy, childbirth and the puerperium, Personal history of other medical treatment, and Personal history of other medical treatment.    Surgical History  She has a past surgical history that includes Other surgical history (07/15/2015); Tubal ligation (07/15/2015); Tonsillectomy (07/24/2017); and Incisional breast biopsy (07/21/2015).     Social History  She reports that she has quit smoking. Her smoking use included cigarettes. She has never been exposed to tobacco smoke. She has never used smokeless tobacco. She reports current alcohol use. She reports that she does not use drugs.    Family History  No family history on file.     Allergies  Patient has no known allergies.    Review of Systems  Review of Systems   Constitutional:  Negative for chills, fatigue and fever.   Respiratory:  Negative for chest tightness, shortness of breath and wheezing.    Cardiovascular:  Negative for chest pain and palpitations.   Gastrointestinal:  Negative for constipation, diarrhea and nausea.   Genitourinary:  Negative for difficulty urinating and frequency.   Musculoskeletal:  Positive for back pain and myalgias.        Lower  "back pain and right buttock pain    Neurological:  Negative for dizziness, weakness and numbness.   Psychiatric/Behavioral:  Negative for agitation, confusion and suicidal ideas.         Physical Exam  Physical Exam  Constitutional:       General: She is not in acute distress.     Appearance: Normal appearance.          Comments: Decreased range of motion with lumbar flexion extension, pain with palpation of bilateral lumbar paraspinals, right sacroiliac notch tender to palpation, right gluteal region tenderness.  Bilateral SLR negative, right Ricardo Renetta positive right sacroiliac thigh thrust positive   HENT:      Head: Normocephalic.   Eyes:      Extraocular Movements: Extraocular movements intact.   Cardiovascular:      Rate and Rhythm: Normal rate.   Musculoskeletal:      Lumbar back: Tenderness present. Decreased range of motion. Negative right straight leg raise test and negative left straight leg raise test.   Neurological:      General: No focal deficit present.      Mental Status: She is alert and oriented to person, place, and time. Mental status is at baseline.      GCS: GCS eye subscore is 4. GCS verbal subscore is 5. GCS motor subscore is 6.      Cranial Nerves: Cranial nerves 2-12 are intact. No cranial nerve deficit.      Sensory: No sensory deficit.      Motor: Motor function is intact.      Coordination: Coordination is intact.      Gait: Gait is intact.      Deep Tendon Reflexes:      Reflex Scores:       Patellar reflexes are 3+ on the right side and 3+ on the left side.       Achilles reflexes are 3+ on the right side and 3+ on the left side.  Psychiatric:         Mood and Affect: Mood normal.         Behavior: Behavior normal.         Thought Content: Thought content normal.         Judgment: Judgment normal.           Last Recorded Vitals  Blood pressure 141/65, pulse 79, temperature 36.6 °C (97.9 °F), temperature source Temporal, resp. rate 18, height 1.6 m (5' 3\"), weight 64.9 kg (143 lb), " SpO2 98%.    Reviewed Images  6/27/2024 x-ray right hip with pelvis  FINDINGS:  Two views. No fracture or suspicious osseous lesions. Minimal  degenerative spurring inferior femoral head neck junction without  significant joint space narrowing. There are degenerative changes  lumbosacral junction. Brown metallic density structure projects  lateral gluteal region likely overlapping the patient although  attention recommended on clinical assessment.      IMPRESSION:  Minimal degenerative spurring right hip.      Degenerative changes lumbosacral spine.      Rounded density overlying the right gluteal region.        Reviewed Labs  Lab Results   Component Value Date    GLUCOSE 85 11/22/2023    CALCIUM 10.0 11/22/2023     11/22/2023    K 3.9 11/22/2023    CO2 27 11/22/2023     11/22/2023    BUN 11 11/22/2023    CREATININE 0.76 11/22/2023        Assessment/Plan     Lilli Guzman is a very pleasant 64 y.o. female here for follow-up of lower back pain and right gluteal region pain.  She had previously received multiple left and right lumbar epidural steroid injections, medial branch blocks with significant improvement in symptoms which has lasted almost 2 years, her pain is now returned after increasing her activity levels and caring for her 4-year-old niece.  She had right hip and pelvis x-ray which showed minimal degenerative changes.  She has completed 3 sessions of physical therapy which increased her pain levels.  She does report continuing home stretching regimen as tolerated.  She manages her pain with tramadol 50 mg from Dr. Lam which brings her pain from 8/10 to 6/10.  On physical examination she has decreased lumbar range of motion flexion extension, pain on palpation to right sacroiliac notch and bilateral lumbar paraspinals right worse than left.  Right gluteal region tender to palpation, no tenderness with deep palpation of piriformis region.  Right Ricardo Renetta and sacroiliac thigh thrust  positive.  Bilateral SLR negative.  Patient denies bowel or bladder incontinence or saddle paresthesia.  Symptoms consistent with sacroiliitis.  Will plan for right sacral iliac joint steroid injection under fluoroscopy.  Encouraged to continue with home stretching as tolerated.  Continue with current pain management medication as prescribed.  Encouraged to take at 1000 mg Tylenol 3 times a day.  All questions and concerns answered.  Plan to follow-up 6 to 8 weeks after right sacroiliac joint steroid injection.    Plan  At least 50% of the visit was involved in the discussion of the options for treatment. We discussed exercises, medication, interventional therapies and surgery. Healthy life style is essential with patient hard work to achieve the wellness. In addition; discussion with the patient and/or family about any of the diagnostic results, impressions and/or recommended diagnostic studies, prognosis, risks and benefits of treatment options, instructions for treatment and/or follow-up, importance of compliance with chosen treatment options, risk-factor reduction, and patient/family education.        *Please note this report has been produced using speech recognition software and may contain errors related to that system including grammar, punctuation and spelling as well as words and phrases that may be inappropriate. If there are questions or concerns, please feel free to contact me to clarify.    I spent 40 minutes in the professional and overall care of this patient.       Khanh Hay, MYRNA-CNP

## 2024-09-11 ENCOUNTER — APPOINTMENT (OUTPATIENT)
Dept: PAIN MEDICINE | Facility: CLINIC | Age: 64
End: 2024-09-11
Payer: COMMERCIAL

## 2024-09-13 ENCOUNTER — HOSPITAL ENCOUNTER (OUTPATIENT)
Dept: PAIN MEDICINE | Facility: CLINIC | Age: 64
Discharge: HOME | End: 2024-09-13
Payer: COMMERCIAL

## 2024-09-13 VITALS
TEMPERATURE: 98.2 F | OXYGEN SATURATION: 97 % | HEIGHT: 64 IN | BODY MASS INDEX: 24.41 KG/M2 | WEIGHT: 143 LBS | RESPIRATION RATE: 16 BRPM | HEART RATE: 88 BPM

## 2024-09-13 DIAGNOSIS — M54.9 BACK PAIN, UNSPECIFIED BACK LOCATION, UNSPECIFIED BACK PAIN LATERALITY, UNSPECIFIED CHRONICITY: ICD-10-CM

## 2024-09-13 DIAGNOSIS — M46.1 SACROILIITIS (CMS-HCC): ICD-10-CM

## 2024-09-13 PROCEDURE — 2500000005 HC RX 250 GENERAL PHARMACY W/O HCPCS: Performed by: ANESTHESIOLOGY

## 2024-09-13 PROCEDURE — 2500000004 HC RX 250 GENERAL PHARMACY W/ HCPCS (ALT 636 FOR OP/ED): Performed by: ANESTHESIOLOGY

## 2024-09-13 PROCEDURE — 27096 INJECT SACROILIAC JOINT: CPT | Performed by: ANESTHESIOLOGY

## 2024-09-13 RX ORDER — TRIAMCINOLONE ACETONIDE 40 MG/ML
INJECTION, SUSPENSION INTRA-ARTICULAR; INTRAMUSCULAR AS NEEDED
Status: COMPLETED | OUTPATIENT
Start: 2024-09-13 | End: 2024-09-13

## 2024-09-13 RX ORDER — ROPIVACAINE HYDROCHLORIDE 5 MG/ML
INJECTION, SOLUTION EPIDURAL; INFILTRATION; PERINEURAL AS NEEDED
Status: COMPLETED | OUTPATIENT
Start: 2024-09-13 | End: 2024-09-13

## 2024-09-13 RX ORDER — LIDOCAINE HYDROCHLORIDE 10 MG/ML
INJECTION, SOLUTION EPIDURAL; INFILTRATION; INTRACAUDAL; PERINEURAL AS NEEDED
Status: COMPLETED | OUTPATIENT
Start: 2024-09-13 | End: 2024-09-13

## 2024-09-13 ASSESSMENT — PAIN DESCRIPTION - DESCRIPTORS: DESCRIPTORS: ACHING;SHARP

## 2024-09-13 ASSESSMENT — PAIN - FUNCTIONAL ASSESSMENT
PAIN_FUNCTIONAL_ASSESSMENT: 0-10
PAIN_FUNCTIONAL_ASSESSMENT: 0-10

## 2024-09-13 ASSESSMENT — ENCOUNTER SYMPTOMS
DEPRESSION: 0
OCCASIONAL FEELINGS OF UNSTEADINESS: 0
LOSS OF SENSATION IN FEET: 0

## 2024-09-13 ASSESSMENT — COLUMBIA-SUICIDE SEVERITY RATING SCALE - C-SSRS
2. HAVE YOU ACTUALLY HAD ANY THOUGHTS OF KILLING YOURSELF?: NO
1. IN THE PAST MONTH, HAVE YOU WISHED YOU WERE DEAD OR WISHED YOU COULD GO TO SLEEP AND NOT WAKE UP?: NO
6. HAVE YOU EVER DONE ANYTHING, STARTED TO DO ANYTHING, OR PREPARED TO DO ANYTHING TO END YOUR LIFE?: NO

## 2024-09-13 ASSESSMENT — PAIN SCALES - GENERAL
PAINLEVEL_OUTOF10: 8
PAINLEVEL_OUTOF10: 0 - NO PAIN

## 2024-09-13 NOTE — Clinical Note
Patient tolerated the procedure well and is comfortable with no complaints of pain. Vital signs stable. Arousable prior to transport. Patient transported to PACU via Wheelchair. Handoff completed.

## 2024-10-28 ENCOUNTER — APPOINTMENT (OUTPATIENT)
Dept: PAIN MEDICINE | Facility: CLINIC | Age: 64
End: 2024-10-28
Payer: COMMERCIAL

## 2024-10-28 ENCOUNTER — OFFICE VISIT (OUTPATIENT)
Dept: PAIN MEDICINE | Facility: CLINIC | Age: 64
End: 2024-10-28
Payer: COMMERCIAL

## 2024-10-28 VITALS
TEMPERATURE: 97.2 F | BODY MASS INDEX: 24.41 KG/M2 | OXYGEN SATURATION: 98 % | HEART RATE: 84 BPM | RESPIRATION RATE: 18 BRPM | HEIGHT: 64 IN | SYSTOLIC BLOOD PRESSURE: 151 MMHG | WEIGHT: 143 LBS | DIASTOLIC BLOOD PRESSURE: 68 MMHG

## 2024-10-28 DIAGNOSIS — M79.18 GLUTEAL PAIN: Primary | ICD-10-CM

## 2024-10-28 DIAGNOSIS — M53.3 SI (SACROILIAC) JOINT DYSFUNCTION: ICD-10-CM

## 2024-10-28 PROCEDURE — 1125F AMNT PAIN NOTED PAIN PRSNT: CPT | Performed by: ANESTHESIOLOGY

## 2024-10-28 PROCEDURE — 3008F BODY MASS INDEX DOCD: CPT | Performed by: ANESTHESIOLOGY

## 2024-10-28 PROCEDURE — 1159F MED LIST DOCD IN RCRD: CPT | Performed by: ANESTHESIOLOGY

## 2024-10-28 PROCEDURE — 99213 OFFICE O/P EST LOW 20 MIN: CPT | Performed by: ANESTHESIOLOGY

## 2024-10-28 ASSESSMENT — PAIN - FUNCTIONAL ASSESSMENT: PAIN_FUNCTIONAL_ASSESSMENT: 0-10

## 2024-10-28 ASSESSMENT — ENCOUNTER SYMPTOMS
SHORTNESS OF BREATH: 0
OCCASIONAL FEELINGS OF UNSTEADINESS: 0
LOSS OF SENSATION IN FEET: 0
DEPRESSION: 0
BACK PAIN: 1
EYE REDNESS: 0

## 2024-10-28 ASSESSMENT — PAIN SCALES - GENERAL
PAINLEVEL_OUTOF10: 3
PAINLEVEL_OUTOF10: 3

## 2024-10-28 ASSESSMENT — PAIN DESCRIPTION - DESCRIPTORS: DESCRIPTORS: ACHING;BURNING

## 2025-01-07 ENCOUNTER — OFFICE VISIT (OUTPATIENT)
Dept: PAIN MEDICINE | Facility: CLINIC | Age: 65
End: 2025-01-07
Payer: COMMERCIAL

## 2025-01-07 VITALS
HEIGHT: 63 IN | RESPIRATION RATE: 18 BRPM | BODY MASS INDEX: 24.8 KG/M2 | OXYGEN SATURATION: 98 % | HEART RATE: 90 BPM | TEMPERATURE: 96.3 F | SYSTOLIC BLOOD PRESSURE: 147 MMHG | DIASTOLIC BLOOD PRESSURE: 63 MMHG | WEIGHT: 140 LBS

## 2025-01-07 DIAGNOSIS — M53.3 SACROILIAC JOINT DYSFUNCTION OF LEFT SIDE: Primary | ICD-10-CM

## 2025-01-07 PROCEDURE — 99213 OFFICE O/P EST LOW 20 MIN: CPT | Performed by: ANESTHESIOLOGY

## 2025-01-07 PROCEDURE — 3008F BODY MASS INDEX DOCD: CPT | Performed by: ANESTHESIOLOGY

## 2025-01-07 PROCEDURE — 1159F MED LIST DOCD IN RCRD: CPT | Performed by: ANESTHESIOLOGY

## 2025-01-07 PROCEDURE — 1160F RVW MEDS BY RX/DR IN RCRD: CPT | Performed by: ANESTHESIOLOGY

## 2025-01-07 PROCEDURE — 1125F AMNT PAIN NOTED PAIN PRSNT: CPT | Performed by: ANESTHESIOLOGY

## 2025-01-07 PROCEDURE — 1036F TOBACCO NON-USER: CPT | Performed by: ANESTHESIOLOGY

## 2025-01-07 ASSESSMENT — ENCOUNTER SYMPTOMS
LOSS OF SENSATION IN FEET: 0
SHORTNESS OF BREATH: 0
BACK PAIN: 1
EYE REDNESS: 0
OCCASIONAL FEELINGS OF UNSTEADINESS: 0
DEPRESSION: 0

## 2025-01-07 ASSESSMENT — PAIN SCALES - GENERAL
PAINLEVEL_OUTOF10: 8
PAINLEVEL_OUTOF10: 8

## 2025-01-07 ASSESSMENT — PATIENT HEALTH QUESTIONNAIRE - PHQ9
1. LITTLE INTEREST OR PLEASURE IN DOING THINGS: NOT AT ALL
SUM OF ALL RESPONSES TO PHQ9 QUESTIONS 1 AND 2: 0
2. FEELING DOWN, DEPRESSED OR HOPELESS: NOT AT ALL

## 2025-01-07 ASSESSMENT — LIFESTYLE VARIABLES
HOW MANY STANDARD DRINKS CONTAINING ALCOHOL DO YOU HAVE ON A TYPICAL DAY: 1 OR 2
HOW OFTEN DO YOU HAVE A DRINK CONTAINING ALCOHOL: 2-4 TIMES A MONTH

## 2025-01-07 ASSESSMENT — PAIN DESCRIPTION - DESCRIPTORS: DESCRIPTORS: TIGHTNESS;PRESSURE

## 2025-01-07 ASSESSMENT — PAIN - FUNCTIONAL ASSESSMENT: PAIN_FUNCTIONAL_ASSESSMENT: 0-10

## 2025-01-07 NOTE — PROGRESS NOTES
Chief Complain  Follow-up (For pain in back more on the left, Pt been helping./Take tramdol as needed)       History Of Present Illness  Lilli Guzman is a 64 y.o. female here for left sided low back pain. She has been having this pain since last month. The patient rates the pain at 7  on a scale from 0-10.  The patient describes pain as aching.  The pain is worsened by prolonged sitting and going up stairs and is alleviated by lying down.  Since the last visit the pain has worsened.  The patient denies any fever, chills, weight loss, bladder/bowel incontinence.         Past Medical History  She has a past medical history of Atypical squamous cells of undetermined significance on cytologic smear of cervix (ASC-US), Encounter for screening for malignant neoplasm of vagina, Menopausal and female climacteric states, Other specified postprocedural states, Other specified postprocedural states, Personal history of other complications of pregnancy, childbirth and the puerperium, Personal history of other medical treatment, and Personal history of other medical treatment.    Surgical History  She has a past surgical history that includes Other surgical history (07/15/2015); Tubal ligation (07/15/2015); Tonsillectomy (07/24/2017); and Incisional breast biopsy (07/21/2015).     Social History  She reports that she has quit smoking. Her smoking use included cigarettes. She has never been exposed to tobacco smoke. She has never used smokeless tobacco. She reports current alcohol use. She reports that she does not use drugs.    Family History  No family history on file.     Allergies  Patient has no known allergies.    Review of Systems  Review of Systems   HENT:  Negative for ear pain.    Eyes:  Negative for redness.   Respiratory:  Negative for shortness of breath.    Cardiovascular:  Negative for chest pain.   Musculoskeletal:  Positive for back pain.   Psychiatric/Behavioral:  Negative for suicidal ideas.      "    Physical Exam  Physical Exam  Constitutional:       Appearance: Normal appearance.   HENT:      Head: Normocephalic and atraumatic.   Eyes:      Extraocular Movements: Extraocular movements intact.      Pupils: Pupils are equal, round, and reactive to light.   Pulmonary:      Effort: Pulmonary effort is normal.   Musculoskeletal:      Cervical back: Neck supple.   Skin:     General: Skin is warm.   Neurological:      Mental Status: She is alert and oriented to person, place, and time.   Psychiatric:         Mood and Affect: Mood normal.       Reviewed Images  Reviewed and independently interpreted MRI Lumbar spine no severe spinal canal or neural foraminal narrowing, no significant spondylolisthesis.        Last Recorded Vitals  Blood pressure 147/63, pulse 90, temperature 35.7 °C (96.3 °F), resp. rate 18, height 1.6 m (5' 3\"), weight 63.5 kg (140 lb), SpO2 98%.       Assessment/Plan     Lilli Guzman is a 64 y.o. female here for left sided low back pain.  She is status post right-sided SI joint injection which has provided her with significant relief of pain.  Currently mostly bothered by the left side of her low back area review and sacral sulcus area.  She does not have any significant neurological or constitutional symptoms.  The pain is worse with changing position.  On physical examination she does have positive provocative maneuvers of left SI joint dysfunction during thigh thirst, Andree finger test, Gaenslen test.  Review of her MRI does not reveal any significant spinal canal or neuroforaminal narrowing. I would recommend trial of nonstick and therapeutic left SI joint injection as next step in managing her pain.      Keaton Medina MD  "

## 2025-01-22 ENCOUNTER — HOSPITAL ENCOUNTER (OUTPATIENT)
Dept: PAIN MEDICINE | Facility: CLINIC | Age: 65
Discharge: HOME | End: 2025-01-22
Payer: COMMERCIAL

## 2025-01-22 VITALS
RESPIRATION RATE: 18 BRPM | SYSTOLIC BLOOD PRESSURE: 143 MMHG | TEMPERATURE: 96.4 F | OXYGEN SATURATION: 96 % | BODY MASS INDEX: 24.8 KG/M2 | WEIGHT: 140 LBS | HEART RATE: 82 BPM | DIASTOLIC BLOOD PRESSURE: 67 MMHG

## 2025-01-22 DIAGNOSIS — M53.3 SACROILIAC JOINT DYSFUNCTION OF LEFT SIDE: ICD-10-CM

## 2025-01-22 PROCEDURE — 2500000004 HC RX 250 GENERAL PHARMACY W/ HCPCS (ALT 636 FOR OP/ED): Performed by: ANESTHESIOLOGY

## 2025-01-22 PROCEDURE — 2550000001 HC RX 255 CONTRASTS: Performed by: ANESTHESIOLOGY

## 2025-01-22 PROCEDURE — 27096 INJECT SACROILIAC JOINT: CPT | Performed by: ANESTHESIOLOGY

## 2025-01-22 RX ORDER — LIDOCAINE HYDROCHLORIDE 10 MG/ML
INJECTION, SOLUTION EPIDURAL; INFILTRATION; INTRACAUDAL; PERINEURAL AS NEEDED
Status: COMPLETED | OUTPATIENT
Start: 2025-01-22 | End: 2025-01-22

## 2025-01-22 RX ORDER — ROPIVACAINE HYDROCHLORIDE 5 MG/ML
INJECTION, SOLUTION EPIDURAL; INFILTRATION; PERINEURAL AS NEEDED
Status: COMPLETED | OUTPATIENT
Start: 2025-01-22 | End: 2025-01-22

## 2025-01-22 RX ORDER — TRIAMCINOLONE ACETONIDE 40 MG/ML
INJECTION, SUSPENSION INTRA-ARTICULAR; INTRAMUSCULAR AS NEEDED
Status: COMPLETED | OUTPATIENT
Start: 2025-01-22 | End: 2025-01-22

## 2025-01-22 RX ADMIN — TRIAMCINOLONE ACETONIDE 40 MG: 40 INJECTION, SUSPENSION INTRA-ARTICULAR; INTRAMUSCULAR at 14:28

## 2025-01-22 RX ADMIN — IOHEXOL 3 ML: 300 INJECTION, SOLUTION INTRAVENOUS at 14:27

## 2025-01-22 RX ADMIN — LIDOCAINE HYDROCHLORIDE 30 ML: 10 INJECTION, SOLUTION EPIDURAL; INFILTRATION; INTRACAUDAL; PERINEURAL at 14:27

## 2025-01-22 RX ADMIN — ROPIVACAINE HYDROCHLORIDE 10 ML: 5 INJECTION, SOLUTION EPIDURAL; INFILTRATION; PERINEURAL at 14:28

## 2025-01-22 ASSESSMENT — ENCOUNTER SYMPTOMS
LOSS OF SENSATION IN FEET: 0
OCCASIONAL FEELINGS OF UNSTEADINESS: 0
DEPRESSION: 0

## 2025-01-22 ASSESSMENT — PAIN - FUNCTIONAL ASSESSMENT
PAIN_FUNCTIONAL_ASSESSMENT: 0-10
PAIN_FUNCTIONAL_ASSESSMENT: 0-10

## 2025-01-22 ASSESSMENT — PAIN SCALES - GENERAL
PAINLEVEL_OUTOF10: 10 - WORST POSSIBLE PAIN
PAINLEVEL_OUTOF10: 7

## 2025-03-04 ENCOUNTER — APPOINTMENT (OUTPATIENT)
Dept: PAIN MEDICINE | Facility: CLINIC | Age: 65
End: 2025-03-04
Payer: COMMERCIAL

## 2025-03-04 VITALS
HEART RATE: 86 BPM | DIASTOLIC BLOOD PRESSURE: 66 MMHG | SYSTOLIC BLOOD PRESSURE: 145 MMHG | BODY MASS INDEX: 24.8 KG/M2 | HEIGHT: 63 IN | RESPIRATION RATE: 18 BRPM | TEMPERATURE: 96.8 F | OXYGEN SATURATION: 98 %

## 2025-03-04 DIAGNOSIS — M47.817 LUMBOSACRAL SPONDYLOSIS WITHOUT MYELOPATHY: Primary | ICD-10-CM

## 2025-03-04 PROCEDURE — 1159F MED LIST DOCD IN RCRD: CPT | Performed by: ANESTHESIOLOGY

## 2025-03-04 PROCEDURE — 1125F AMNT PAIN NOTED PAIN PRSNT: CPT | Performed by: ANESTHESIOLOGY

## 2025-03-04 PROCEDURE — 99213 OFFICE O/P EST LOW 20 MIN: CPT | Performed by: ANESTHESIOLOGY

## 2025-03-04 SDOH — ECONOMIC STABILITY: FOOD INSECURITY: WITHIN THE PAST 12 MONTHS, THE FOOD YOU BOUGHT JUST DIDN'T LAST AND YOU DIDN'T HAVE MONEY TO GET MORE.: NEVER TRUE

## 2025-03-04 SDOH — ECONOMIC STABILITY: FOOD INSECURITY: WITHIN THE PAST 12 MONTHS, YOU WORRIED THAT YOUR FOOD WOULD RUN OUT BEFORE YOU GOT MONEY TO BUY MORE.: NEVER TRUE

## 2025-03-04 ASSESSMENT — LIFESTYLE VARIABLES
SKIP TO QUESTIONS 9-10: 1
HOW MANY STANDARD DRINKS CONTAINING ALCOHOL DO YOU HAVE ON A TYPICAL DAY: PATIENT DOES NOT DRINK
HOW OFTEN DO YOU HAVE SIX OR MORE DRINKS ON ONE OCCASION: NEVER
HOW OFTEN DO YOU HAVE A DRINK CONTAINING ALCOHOL: NEVER
AUDIT-C TOTAL SCORE: 0

## 2025-03-04 ASSESSMENT — ENCOUNTER SYMPTOMS
OCCASIONAL FEELINGS OF UNSTEADINESS: 0
EYE REDNESS: 0
BACK PAIN: 1
LOSS OF SENSATION IN FEET: 0
SHORTNESS OF BREATH: 0

## 2025-03-04 ASSESSMENT — PAIN - FUNCTIONAL ASSESSMENT: PAIN_FUNCTIONAL_ASSESSMENT: 0-10

## 2025-03-04 ASSESSMENT — PAIN DESCRIPTION - DESCRIPTORS: DESCRIPTORS: ACHING

## 2025-03-04 ASSESSMENT — PAIN SCALES - GENERAL
PAINLEVEL_OUTOF10: 8
PAINLEVEL_OUTOF10: 8

## 2025-03-04 NOTE — PROGRESS NOTES
Chief Complain  Follow-up (Had SI joint injection on 1/22/25 with no relief/Pain is ion lower left back. )       History Of Present Illness  Lilli Guzman is a 65 y.o. female here for left sided low back pain. She has been having this pain since last month. The patient rates the pain at 8  on a scale from 0-10.  The patient describes pain as aching.  The pain is worsened by prolonged sitting and going up stairs and is alleviated by lying down.  Since the last visit the pain has stayed the same.  The patient denies any fever, chills, weight loss, bladder/bowel incontinence.         Past Medical History  She has a past medical history of Atypical squamous cells of undetermined significance on cytologic smear of cervix (ASC-US), Encounter for screening for malignant neoplasm of vagina, Menopausal and female climacteric states, Other specified postprocedural states, Other specified postprocedural states, Personal history of other complications of pregnancy, childbirth and the puerperium, Personal history of other medical treatment, and Personal history of other medical treatment.    Surgical History  She has a past surgical history that includes Other surgical history (07/15/2015); Tubal ligation (07/15/2015); Tonsillectomy (07/24/2017); and Incisional breast biopsy (07/21/2015).     Social History  She reports that she has quit smoking. Her smoking use included cigarettes. She has never been exposed to tobacco smoke. She has never used smokeless tobacco. She reports current alcohol use. She reports that she does not use drugs.    Family History  No family history on file.     Allergies  Patient has no known allergies.    Review of Systems  Review of Systems   HENT:  Negative for ear pain.    Eyes:  Negative for redness.   Respiratory:  Negative for shortness of breath.    Cardiovascular:  Negative for chest pain.   Musculoskeletal:  Positive for back pain.   Psychiatric/Behavioral:  Negative for suicidal ideas.      "    Physical Exam  Physical Exam  Constitutional:       Appearance: Normal appearance.   HENT:      Head: Normocephalic and atraumatic.   Eyes:      Extraocular Movements: Extraocular movements intact.      Pupils: Pupils are equal, round, and reactive to light.   Pulmonary:      Effort: Pulmonary effort is normal.   Musculoskeletal:      Cervical back: Neck supple.   Skin:     General: Skin is warm.   Neurological:      Mental Status: She is alert and oriented to person, place, and time.   Psychiatric:         Mood and Affect: Mood normal.       Reviewed Images  Reviewed and independently interpreted MRI Lumbar spine no severe spinal canal or neural foraminal narrowing, no significant spondylolisthesis.  In addition she had type I Modic changes at L4-5, lower lumbar facet arthrosis        Last Recorded Vitals  Blood pressure 145/66, pulse 86, temperature 36 °C (96.8 °F), resp. rate 18, height 1.6 m (5' 3\"), SpO2 98%.       Assessment/Plan     Lilli Guzman is a 65 y.o. female here for left sided low back pain.  Last visit we did left SI joint injection she did not get any significant relief from the injection.  Review of the MRI lumbar spine did reveal lower lumbar facet arthrosis, type I Modic change L4-5.  Previously in 2022 she had SI joint injection which provided her with significant relief of pain lasting for in a year or so.  On physical examination today she does have axial low back pain localized on the left lower lumbar paraspinous area.  She does have positive facet loading.  I would recommend doing a diagnostic and therapeutic left L4-5, L5-S1 facet joint injection as next step in managing her pain using fluoroscopic guidance.  Prescription also provided for chiropractor manipulations.    Total time spent caring for the patient today was 24 minutes. This includes time spent before the visit reviewing the chart, time spent during the visit, and time spent after the visit on documentation.    Keaton" MD Adam

## 2025-03-05 ENCOUNTER — APPOINTMENT (OUTPATIENT)
Dept: PAIN MEDICINE | Facility: CLINIC | Age: 65
End: 2025-03-05
Payer: COMMERCIAL

## 2025-03-19 ENCOUNTER — HOSPITAL ENCOUNTER (OUTPATIENT)
Dept: PAIN MEDICINE | Facility: CLINIC | Age: 65
Discharge: HOME | End: 2025-03-19
Payer: COMMERCIAL

## 2025-03-19 VITALS
OXYGEN SATURATION: 97 % | WEIGHT: 140 LBS | RESPIRATION RATE: 18 BRPM | BODY MASS INDEX: 24.8 KG/M2 | SYSTOLIC BLOOD PRESSURE: 139 MMHG | DIASTOLIC BLOOD PRESSURE: 71 MMHG | HEIGHT: 63 IN | TEMPERATURE: 96.8 F | HEART RATE: 81 BPM

## 2025-03-19 DIAGNOSIS — M47.817 LUMBOSACRAL SPONDYLOSIS WITHOUT MYELOPATHY: ICD-10-CM

## 2025-03-19 PROCEDURE — 2550000001 HC RX 255 CONTRASTS: Performed by: ANESTHESIOLOGY

## 2025-03-19 PROCEDURE — 2500000004 HC RX 250 GENERAL PHARMACY W/ HCPCS (ALT 636 FOR OP/ED): Performed by: ANESTHESIOLOGY

## 2025-03-19 PROCEDURE — 64494 INJ PARAVERT F JNT L/S 2 LEV: CPT | Performed by: ANESTHESIOLOGY

## 2025-03-19 PROCEDURE — 64493 INJ PARAVERT F JNT L/S 1 LEV: CPT | Performed by: ANESTHESIOLOGY

## 2025-03-19 RX ORDER — METHYLPREDNISOLONE ACETATE 40 MG/ML
INJECTION, SUSPENSION INTRA-ARTICULAR; INTRALESIONAL; INTRAMUSCULAR; SOFT TISSUE AS NEEDED
Status: COMPLETED | OUTPATIENT
Start: 2025-03-19 | End: 2025-03-19

## 2025-03-19 RX ORDER — ROPIVACAINE HYDROCHLORIDE 5 MG/ML
INJECTION, SOLUTION EPIDURAL; INFILTRATION; PERINEURAL AS NEEDED
Status: COMPLETED | OUTPATIENT
Start: 2025-03-19 | End: 2025-03-19

## 2025-03-19 RX ORDER — LIDOCAINE HYDROCHLORIDE 10 MG/ML
INJECTION, SOLUTION EPIDURAL; INFILTRATION; INTRACAUDAL; PERINEURAL AS NEEDED
Status: COMPLETED | OUTPATIENT
Start: 2025-03-19 | End: 2025-03-19

## 2025-03-19 RX ADMIN — IOHEXOL 3 ML: 300 INJECTION, SOLUTION INTRAVENOUS at 13:26

## 2025-03-19 RX ADMIN — ROPIVACAINE HYDROCHLORIDE 10 ML: 5 INJECTION, SOLUTION EPIDURAL; INFILTRATION; PERINEURAL at 13:26

## 2025-03-19 RX ADMIN — LIDOCAINE HYDROCHLORIDE 30 ML: 10 INJECTION, SOLUTION EPIDURAL; INFILTRATION; INTRACAUDAL; PERINEURAL at 13:26

## 2025-03-19 RX ADMIN — METHYLPREDNISOLONE ACETATE 40 MG: 40 INJECTION, SUSPENSION INTRA-ARTICULAR; INTRALESIONAL; INTRAMUSCULAR; INTRASYNOVIAL; SOFT TISSUE at 13:26

## 2025-03-19 ASSESSMENT — PAIN SCALES - GENERAL: PAINLEVEL_OUTOF10: 1

## 2025-03-19 ASSESSMENT — PAIN - FUNCTIONAL ASSESSMENT: PAIN_FUNCTIONAL_ASSESSMENT: 0-10

## 2025-04-23 ENCOUNTER — APPOINTMENT (OUTPATIENT)
Dept: PAIN MEDICINE | Facility: CLINIC | Age: 65
End: 2025-04-23
Payer: COMMERCIAL

## 2025-04-23 VITALS
DIASTOLIC BLOOD PRESSURE: 72 MMHG | TEMPERATURE: 97.7 F | BODY MASS INDEX: 24.8 KG/M2 | RESPIRATION RATE: 18 BRPM | HEART RATE: 93 BPM | HEIGHT: 63 IN | WEIGHT: 140 LBS | SYSTOLIC BLOOD PRESSURE: 128 MMHG | OXYGEN SATURATION: 94 %

## 2025-04-23 DIAGNOSIS — M47.817 LUMBOSACRAL SPONDYLOSIS WITHOUT MYELOPATHY: Primary | ICD-10-CM

## 2025-04-23 PROCEDURE — 99213 OFFICE O/P EST LOW 20 MIN: CPT | Performed by: ANESTHESIOLOGY

## 2025-04-23 PROCEDURE — 3008F BODY MASS INDEX DOCD: CPT | Performed by: ANESTHESIOLOGY

## 2025-04-23 PROCEDURE — 1036F TOBACCO NON-USER: CPT | Performed by: ANESTHESIOLOGY

## 2025-04-23 PROCEDURE — 1125F AMNT PAIN NOTED PAIN PRSNT: CPT | Performed by: ANESTHESIOLOGY

## 2025-04-23 ASSESSMENT — PATIENT HEALTH QUESTIONNAIRE - PHQ9
2. FEELING DOWN, DEPRESSED OR HOPELESS: NOT AT ALL
1. LITTLE INTEREST OR PLEASURE IN DOING THINGS: NOT AT ALL
SUM OF ALL RESPONSES TO PHQ9 QUESTIONS 1 AND 2: 0

## 2025-04-23 ASSESSMENT — PAIN SCALES - GENERAL
PAINLEVEL_OUTOF10: 7
PAINLEVEL_OUTOF10: 7

## 2025-04-23 ASSESSMENT — ENCOUNTER SYMPTOMS
EYE REDNESS: 0
LOSS OF SENSATION IN FEET: 0
OCCASIONAL FEELINGS OF UNSTEADINESS: 0
SHORTNESS OF BREATH: 0
DEPRESSION: 0
BACK PAIN: 1

## 2025-04-23 ASSESSMENT — PAIN - FUNCTIONAL ASSESSMENT: PAIN_FUNCTIONAL_ASSESSMENT: 0-10

## 2025-04-23 ASSESSMENT — LIFESTYLE VARIABLES: TOTAL SCORE: 0

## 2025-04-23 ASSESSMENT — PAIN DESCRIPTION - DESCRIPTORS: DESCRIPTORS: ACHING

## 2025-04-23 NOTE — PROGRESS NOTES
Chief Complain  Follow-up (Had MBB on 3/19 with 70% relief and its still lasting. Is having pain on left side of lower back) and Med Management (Currently take tramdol and helps , managed by another provider)       History Of Present Illness  Lilli Guzman is a 65 y.o. female here for left sided low back pain. She has been having this pain since last month. The patient rates the pain at 5  on a scale from 0-10.  The patient describes pain as aching.  The pain is worsened by prolonged sitting and going up stairs and is alleviated by lying down.  Since the last visit the pain has stayed the same.  The patient denies any fever, chills, weight loss, bladder/bowel incontinence.         Past Medical History  She has a past medical history of Atypical squamous cells of undetermined significance on cytologic smear of cervix (ASC-US), Encounter for screening for malignant neoplasm of vagina, Menopausal and female climacteric states, Other specified postprocedural states, Other specified postprocedural states, Personal history of other complications of pregnancy, childbirth and the puerperium, Personal history of other medical treatment, and Personal history of other medical treatment.    Surgical History  She has a past surgical history that includes Other surgical history (07/15/2015); Tubal ligation (07/15/2015); Tonsillectomy (07/24/2017); and Incisional breast biopsy (07/21/2015).     Social History  She reports that she has quit smoking. Her smoking use included cigarettes. She has never been exposed to tobacco smoke. She has never used smokeless tobacco. She reports current alcohol use. She reports that she does not use drugs.    Family History  No family history on file.     Allergies  Patient has no known allergies.    Review of Systems  Review of Systems   HENT:  Negative for ear pain.    Eyes:  Negative for redness.   Respiratory:  Negative for shortness of breath.    Cardiovascular:  Negative for chest pain.  "  Musculoskeletal:  Positive for back pain.   Psychiatric/Behavioral:  Negative for suicidal ideas.         Physical Exam  Physical Exam  Constitutional:       Appearance: Normal appearance.   HENT:      Head: Normocephalic and atraumatic.   Eyes:      Extraocular Movements: Extraocular movements intact.      Pupils: Pupils are equal, round, and reactive to light.   Pulmonary:      Effort: Pulmonary effort is normal.   Musculoskeletal:      Cervical back: Neck supple.   Skin:     General: Skin is warm.   Neurological:      Mental Status: She is alert and oriented to person, place, and time.   Psychiatric:         Mood and Affect: Mood normal.       Reviewed Images  Reviewed and independently interpreted MRI Lumbar spine no severe spinal canal or neural foraminal narrowing, no significant spondylolisthesis.  In addition she had type I Modic changes at L4-5, lower lumbar facet arthrosis        Last Recorded Vitals  Blood pressure 128/72, pulse 93, temperature 36.5 °C (97.7 °F), resp. rate 18, height 1.6 m (5' 3\"), weight 63.5 kg (140 lb), SpO2 94%.       Assessment/Plan     Lilli Guzman is a 65 y.o. female here for left sided low back pain. Review of the MRI lumbar spine did reveal lower lumbar facet arthrosis, type I Modic change L4-5.  Last visit we did lumbar facet injection at left L4-5, L5-S1 joints.  She reports around 70% improvement in her pain still ongoing.  New neurological, constitutional symptoms.  She is able to sleep much better now after this injection.  Recommend continuation of activity modification.  Repeat of facet joint injection as needed.  Future consideration would include radiofrequency ablation.  Continue with Tylenol up to 3 g a day if needed.          Keaton Medina MD  "